# Patient Record
Sex: MALE | Race: WHITE
[De-identification: names, ages, dates, MRNs, and addresses within clinical notes are randomized per-mention and may not be internally consistent; named-entity substitution may affect disease eponyms.]

---

## 2018-02-21 ENCOUNTER — HOSPITAL ENCOUNTER (OUTPATIENT)
Dept: HOSPITAL 17 - HRSP | Age: 83
End: 2018-02-21
Attending: INTERNAL MEDICINE
Payer: MEDICARE

## 2018-02-21 DIAGNOSIS — R06.02: ICD-10-CM

## 2018-02-21 DIAGNOSIS — I34.0: ICD-10-CM

## 2018-02-21 DIAGNOSIS — I35.0: Primary | ICD-10-CM

## 2018-02-21 DIAGNOSIS — I65.23: ICD-10-CM

## 2018-02-21 PROCEDURE — 94060 EVALUATION OF WHEEZING: CPT

## 2018-02-21 PROCEDURE — 94729 DIFFUSING CAPACITY: CPT

## 2018-02-21 PROCEDURE — 94726 PLETHYSMOGRAPHY LUNG VOLUMES: CPT

## 2018-02-26 NOTE — RSPPFT
DATE OF PROCEDURE:   2/21/18



COMMENTS:   



Spirometry with FVC of 4.8, FEV1 of 2.9, FEV1/FVC ratio at 61%. Slow vital capacity is 
100% of predicted. TLC is 108%. Diffusion capacity is severely reduced.  



IMPRESSION:    

   

1.    Moderate airways obstruction.

2.    Severe reduction in diffusion capacity.

3.    Non-significant response to inhaled bronchodilator.

## 2018-05-03 ENCOUNTER — HOSPITAL ENCOUNTER (OUTPATIENT)
Dept: HOSPITAL 17 - HRAD | Age: 83
Discharge: HOME | End: 2018-05-03
Attending: INTERNAL MEDICINE
Payer: MEDICARE

## 2018-05-03 VITALS
SYSTOLIC BLOOD PRESSURE: 105 MMHG | OXYGEN SATURATION: 94 % | DIASTOLIC BLOOD PRESSURE: 55 MMHG | RESPIRATION RATE: 16 BRPM | HEART RATE: 58 BPM

## 2018-05-03 VITALS — HEIGHT: 68 IN | BODY MASS INDEX: 26.56 KG/M2 | WEIGHT: 175.27 LBS

## 2018-05-03 VITALS
TEMPERATURE: 97.8 F | DIASTOLIC BLOOD PRESSURE: 71 MMHG | OXYGEN SATURATION: 94 % | SYSTOLIC BLOOD PRESSURE: 137 MMHG | RESPIRATION RATE: 18 BRPM | HEART RATE: 63 BPM

## 2018-05-03 VITALS
DIASTOLIC BLOOD PRESSURE: 58 MMHG | RESPIRATION RATE: 16 BRPM | HEART RATE: 60 BPM | SYSTOLIC BLOOD PRESSURE: 107 MMHG | OXYGEN SATURATION: 96 %

## 2018-05-03 VITALS
OXYGEN SATURATION: 94 % | DIASTOLIC BLOOD PRESSURE: 59 MMHG | HEART RATE: 62 BPM | RESPIRATION RATE: 18 BRPM | SYSTOLIC BLOOD PRESSURE: 115 MMHG

## 2018-05-03 VITALS
OXYGEN SATURATION: 95 % | DIASTOLIC BLOOD PRESSURE: 59 MMHG | SYSTOLIC BLOOD PRESSURE: 110 MMHG | RESPIRATION RATE: 16 BRPM | HEART RATE: 60 BPM

## 2018-05-03 VITALS
SYSTOLIC BLOOD PRESSURE: 109 MMHG | DIASTOLIC BLOOD PRESSURE: 66 MMHG | OXYGEN SATURATION: 98 % | RESPIRATION RATE: 18 BRPM | HEART RATE: 66 BPM

## 2018-05-03 VITALS
DIASTOLIC BLOOD PRESSURE: 68 MMHG | SYSTOLIC BLOOD PRESSURE: 100 MMHG | RESPIRATION RATE: 18 BRPM | HEART RATE: 64 BPM | OXYGEN SATURATION: 64 %

## 2018-05-03 VITALS
HEART RATE: 65 BPM | OXYGEN SATURATION: 89 % | SYSTOLIC BLOOD PRESSURE: 112 MMHG | DIASTOLIC BLOOD PRESSURE: 67 MMHG | RESPIRATION RATE: 18 BRPM | TEMPERATURE: 97.9 F

## 2018-05-03 DIAGNOSIS — J44.9: ICD-10-CM

## 2018-05-03 DIAGNOSIS — N18.3: ICD-10-CM

## 2018-05-03 DIAGNOSIS — R91.1: Primary | ICD-10-CM

## 2018-05-03 DIAGNOSIS — E78.00: ICD-10-CM

## 2018-05-03 DIAGNOSIS — Z85.828: ICD-10-CM

## 2018-05-03 DIAGNOSIS — I12.9: ICD-10-CM

## 2018-05-03 PROCEDURE — 99152 MOD SED SAME PHYS/QHP 5/>YRS: CPT

## 2018-05-03 PROCEDURE — 32405: CPT

## 2018-05-03 PROCEDURE — 77012 CT SCAN FOR NEEDLE BIOPSY: CPT

## 2018-05-03 PROCEDURE — 71045 X-RAY EXAM CHEST 1 VIEW: CPT

## 2018-05-03 PROCEDURE — 99153 MOD SED SAME PHYS/QHP EA: CPT

## 2018-05-03 PROCEDURE — 88305 TISSUE EXAM BY PATHOLOGIST: CPT

## 2018-05-03 PROCEDURE — 88333 PATH CONSLTJ SURG CYTO XM 1: CPT

## 2018-05-03 NOTE — RADRPT
EXAM DATE/TIME:  05/03/2018 09:07 

 

[facility] COMPARISON:     

No previous studies available for comparison. However, correlated with outside chest CT.

 

 

INDICATIONS :      

Right upper lobe spiculated nodule.

                     

 

 

SEDATION TIME:       

30 minutes

 

 

BIOPSY SITE:       

Right upper lobe of the lung

                     

 

MEDICATION(S):

1.) 1.5 mg midazolam (Versed) IV  

2.) 75 mcg fentanyl (Sublimaze) IV  

 

 

DEVICE(S):

1.) 20 gauge Temno core biopsy needle 

2.) 18 gauge Fall blunt needle 

                     

 

MEDICAL HISTORY :     

Hypertension. Chronic obstructive pulmonary disease. Renal failure, chronic. skin cancer

 

SURGICAL HISTORY :     

Appendectomy.   

 

ENCOUNTER:     

Initial

 

ACUITY:     

1 day

 

PAIN SCORE:     

0/10

 

LOCATION:     

Right 

                     

A total of three core specimen(s) were obtained and sent to the laboratory for pathologic evaluation.


 

 

PROCEDURE:

1.   CT guided lung biopsy.

2.   Conscious sedation with continuous EKG and oximetry monitoring.

3.   EKG and oximetry remained stable throughout the procedure.

 

 

Prior to the procedure informed consent was obtained.  Any appropriate prior imaging studies were rev
iewed. Using automated exposure control and adjustment of the mA and/or kV according to patient size,
 radiation dose was kept as low as reasonably achievable to obtain optimal diagnostic quality images.
  DICOM format image data is available electronically for review and comparison.  

 

The site was prepped in a sterile fashion.  Full sterile technique was used, including cap, mask, ayaan
rile gloves and gown and a large sterile sheet.  Hand hygiene and 2% chlorhexidine and/or betadine/al
cohol prep was utilized per protocol for cutaneous antisepsis.  The skin and subcutaneous tissues wer
e infiltrated with local anesthetic solution.

 

With CT guidance the right upper lobe lung nodule was localized. Biopsy was performed using the pres
ribed needle as above.  These cytotechnologist was present and reported that an adequate sample was o
btained. Adequate hemostasis was obtained with compression at the puncture site.

 

Follow-up CT scan reveals no pneumothorax. There is moderate perilesional hemorrhage.

 

Conscious sedation was performed with the prescribed dosages and duration as above in the presence of
 an independent trained radiology nurse to assist in the monitoring of the patient.  EKG and oximetry
 remained stable throughout the procedure. The patient tolerated the procedure well and there were no
 complications. The patient was sent to Radiology Outpatient Unit in stable condition.

 

CONCLUSION:     

Uncomplicated CT guided biopsy of the right upper lobe lung nodule.

 

 

 

 Pedro Pablo Landa MD on May 03, 2018 at 9:51           

Board Certified Radiologist.

 This report was verified electronically.

## 2018-05-03 NOTE — RADRPT
EXAM DATE/TIME:  05/03/2018 13:16 

 

[facility] COMPARISON:     

CHEST EXPIRATION ONLY, May 03, 2018, 10:18.

 

                     

INDICATIONS :     

Post right lung biopsy.

                     

 

MEDICAL HISTORY :     

Arthritis.       Hypercholesterolemia. Hypertension Chronic obstructive pulmonary disease.   

 

SURGICAL HISTORY :        

Abdominal aortic aneurysm repair. Appendectomy. Stage III chronic kidney disease.

 

ENCOUNTER:     

Initial                                        

 

ACUITY:     

1 day      

 

PAIN SCORE:     

0/10

 

LOCATION:     

Right chest 

 

FINDINGS:     

A single frontal expiratory view of the chest was performed. Redemonstration of minimal right upper l
obe postprocedural hemorrhage. No evidence of pneumothorax.  Mediastinal structures are in the midlin
e.

 

The cardio-mediastinal contours and bronchopulmonary markings are unremarkable for an expiratory exam
.  Osseous structures are intact.

 

CONCLUSION:     

1. No significant pneumothorax following right upper lobe lung biopsy.

 

 

 

 Mkye Camacho MD on May 03, 2018 at 13:30           

Board Certified Radiologist.

 This report was verified electronically.

## 2018-05-03 NOTE — RADRPT
EXAM DATE/TIME:  05/03/2018 10:18 

 

[facility] COMPARISON:     

No previous studies available for comparison.

 

                     

INDICATIONS :     

Post right lung biopsy.

                     

 

MEDICAL HISTORY :     

Hypercholesterolemia.  Hypertension  Chronic obstructive pulmonary disease.   Arthritis.   

 

SURGICAL HISTORY :     

Abdominal aortic aneurysm repair. Appendectomy.  Stage III chronic kidney disease. 

 

ENCOUNTER:     

Subsequent                                        

 

ACUITY:     

1 day      

 

PAIN SCORE:     

0/10

 

LOCATION:     

Right chest 

 

FINDINGS:     

Upright expiratory view of the chest demonstrates a normal-sized cardiac silhouette with calcificatio
n of the aorta. There is atelectasis at the lung bases. Consolidation is present in the right upper l
obe representing hemorrhage from the recent lung nodule biopsy. No pneumothorax is visualized. Bones 
demonstrate no acute finding.

 

CONCLUSION:     

No pneumothorax following lung nodule biopsy. There is persistent perilesional hemorrhage.

 

 

 

 Pedro Pablo Landa MD on May 03, 2018 at 10:44           

Board Certified Radiologist.

 This report was verified electronically.

## 2018-05-03 NOTE — PD.RAD
Post CT Procedure Prog Note


Pre Procedure Diagnosis:  


(1) Lung nodule


Post Procedure Diagnosis:  


(1) Lung nodule


Procedure Date:


May 3, 2018


Supervising Radiologist:


Pedro Pablo Landa


Estimated blood loss:


minimal.


Anesthesia:  Conscious Sedation


Plan of Activity


Patient to Unit:  [facility]


Patient Condition:  Good


See PACS Report for procedural detail/treatment





Biopsy


Imaging Guidance:  CT


Side:  Right


Biopsy Procedure:  Lung


Site:


right upper lobe.


Specimen:  Core Biopsy


Plan


to [facility] for monitoring then discharge in four hours if criteria met











Pedro Pablo Landa MD May 3, 2018 09:39